# Patient Record
Sex: FEMALE | Race: AMERICAN INDIAN OR ALASKA NATIVE
[De-identification: names, ages, dates, MRNs, and addresses within clinical notes are randomized per-mention and may not be internally consistent; named-entity substitution may affect disease eponyms.]

---

## 2018-10-05 ENCOUNTER — HOSPITAL ENCOUNTER (EMERGENCY)
Dept: HOSPITAL 5 - ED | Age: 23
LOS: 1 days | Discharge: HOME | End: 2018-10-06
Payer: SELF-PAY

## 2018-10-05 DIAGNOSIS — Z88.8: ICD-10-CM

## 2018-10-05 DIAGNOSIS — E86.0: ICD-10-CM

## 2018-10-05 DIAGNOSIS — F12.10: ICD-10-CM

## 2018-10-05 DIAGNOSIS — N30.00: Primary | ICD-10-CM

## 2018-10-05 DIAGNOSIS — Z87.891: ICD-10-CM

## 2018-10-05 DIAGNOSIS — I10: ICD-10-CM

## 2018-10-05 PROCEDURE — 80053 COMPREHEN METABOLIC PANEL: CPT

## 2018-10-05 PROCEDURE — 85025 COMPLETE CBC W/AUTO DIFF WBC: CPT

## 2018-10-05 PROCEDURE — 84703 CHORIONIC GONADOTROPIN ASSAY: CPT

## 2018-10-05 PROCEDURE — 81001 URINALYSIS AUTO W/SCOPE: CPT

## 2018-10-05 PROCEDURE — 96374 THER/PROPH/DIAG INJ IV PUSH: CPT

## 2018-10-05 PROCEDURE — 96361 HYDRATE IV INFUSION ADD-ON: CPT

## 2018-10-05 PROCEDURE — 36415 COLL VENOUS BLD VENIPUNCTURE: CPT

## 2018-10-05 PROCEDURE — 83690 ASSAY OF LIPASE: CPT

## 2018-10-05 PROCEDURE — 99283 EMERGENCY DEPT VISIT LOW MDM: CPT

## 2018-10-06 VITALS — SYSTOLIC BLOOD PRESSURE: 150 MMHG | DIASTOLIC BLOOD PRESSURE: 78 MMHG

## 2018-10-06 LAB
ALBUMIN SERPL-MCNC: 4.6 G/DL (ref 3.9–5)
ALT SERPL-CCNC: 19 UNITS/L (ref 7–56)
BASOPHILS # (AUTO): 0 K/MM3 (ref 0–0.1)
BASOPHILS NFR BLD AUTO: 0.2 % (ref 0–1.8)
BILIRUB UR QL STRIP: (no result)
BLOOD UR QL VISUAL: (no result)
BUN SERPL-MCNC: 9 MG/DL (ref 7–17)
BUN/CREAT SERPL: 11 %
CALCIUM SERPL-MCNC: 9.8 MG/DL (ref 8.4–10.2)
EOSINOPHIL # BLD AUTO: 0.1 K/MM3 (ref 0–0.4)
EOSINOPHIL NFR BLD AUTO: 0.6 % (ref 0–4.3)
HCT VFR BLD CALC: 43.1 % (ref 30.3–42.9)
HEMOLYSIS INDEX: 8
HGB BLD-MCNC: 13.8 GM/DL (ref 10.1–14.3)
LIPASE SERPL-CCNC: 16 UNITS/L (ref 13–60)
LYMPHOCYTES # BLD AUTO: 0.7 K/MM3 (ref 1.2–5.4)
LYMPHOCYTES NFR BLD AUTO: 5.9 % (ref 13.4–35)
MCH RBC QN AUTO: 24 PG (ref 28–32)
MCHC RBC AUTO-ENTMCNC: 32 % (ref 30–34)
MCV RBC AUTO: 76 FL (ref 79–97)
MONOCYTES # (AUTO): 0.6 K/MM3 (ref 0–0.8)
MONOCYTES % (AUTO): 5.3 % (ref 0–7.3)
PH UR STRIP: 5 [PH] (ref 5–7)
PLATELET # BLD: 307 K/MM3 (ref 140–440)
PROT UR STRIP-MCNC: (no result) MG/DL
RBC # BLD AUTO: 5.67 M/MM3 (ref 3.65–5.03)
RBC #/AREA URNS HPF: 3 /HPF (ref 0–6)
UROBILINOGEN UR-MCNC: 4 MG/DL (ref ?–2)
WBC #/AREA URNS HPF: 4 /HPF (ref 0–6)

## 2018-10-06 NOTE — EMERGENCY DEPARTMENT REPORT
Vomiting/Diarrhea





- [facility]


Chief Complaint: Nausea/Vomiting/Diarrhea


Stated Complaint: N/V


Time Seen by Provider: 10/06/18 01:32


Duration: 1 Day


Severity: moderate


Nausea/Vomiting Severity: Moderate


Diarrhea Severity: Mild


Pain Location: Other (no pain)


Pain Severity: None


Symptoms: Yes Watery Diarrhea, Yes Able to Tolerate Fluids, No Recent Unusual 

Foods, No Recent Untreated Water, No Recent use of Antibiotics, No Family w/ 

Similar Symptoms, No Contacts w/ Similar Symptoms, No Rash, No Hematuria, No 

Recent URI Symptoms


Other History: ETOH over indulgance yesterday





ED Review of Systems


ROS: 


Stated complaint: N/V


Other details as noted in HPI





Constitutional: denies: chills, fever


Eyes: denies: eye pain, eye discharge, vision change


ENT: denies: ear pain, throat pain


Cardiovascular: denies: chest pain, palpitations


Gastrointestinal: nausea, vomiting.  denies: abdominal pain, diarrhea, 

constipation, hematemesis, melena, hematochezia


Genitourinary: denies: urgency, dysuria, discharge


Musculoskeletal: denies: back pain, joint swelling, arthralgia


Skin: denies: rash, lesions


Neurological: denies: headache, weakness, numbness, paresthesias, confusion, 

abnormal gait, vertigo


Psychiatric: denies: anxiety, depression


Hematological/Lymphatic: denies: easy bleeding, easy bruising





ED Past Medical Hx





- Past Medical History


Previous Medical History?: Yes


Hx Hypertension: Yes (gestational)





- Surgical History


Past Surgical History?: Yes


Additional Surgical History: C Sect





- Social History


Smoking Status: Former Smoker


Substance Use Type: Alcohol, Marijuana





- Medications


Home Medications: 


 Home Medications











 Medication  Instructions  Recorded  Confirmed  Last Taken  Type


 


Nitrofurantoin Monohyd/M-Cryst 100 mg PO BID #14 capsule 10/06/18  Unknown Rx





[Macrobid 100 mg Capsule]     


 


Ondansetron [Zofran Odt] 4 mg PO TID PRN 7 Days #15 tab 10/06/18  Unknown Rx














Vomiting Diarrhea Exam





- Exam


General: 


Vital signs noted. No distress. Alert and acting appropriately.





HEENT: Yes Moist Mucous Membranes, No Pharyngeal Erythema, No Pharyngeal 

Exudates, No Rhinorrhea, No Conjuctival Injection, No Frontal Tenderness, No 

Maxillary Tenderness


Neck: Yes Adenopathy, No Rigidity


Lungs: Yes Clear Lung Sounds, Yes Good Air Exchange, No Wheezes, No Stridor, No 

Cough, No Nasal Flaring, No Retractions, No Use of Accessory Muscles


Heart exam: Regular: Yes, Murmur: No, Tachycardia: Yes


Abdomen: Tenderness: No, Peritoneal Signs: No, Distention: No, Hyperactive 

Bowel sounds: No


Skin exam: Rash: No, Edema: No, Normal turgor: Yes


Neurologic: 


Alert and oriented, no deficits.








Musculoskeletal: 


Unremarkable.











ED Course


 Vital Signs











  10/05/18 10/05/18





  23:00 23:01


 


Temperature 99.7 F H 


 


Pulse Rate 96 H 97 H


 


Respiratory 18 





Rate  


 


Blood Pressure 150/78 


 


O2 Sat by Pulse 99 100





Oximetry  














ED Medical Decision Making





- Lab Data


Result diagrams: 


 10/06/18 00:12





 10/06/18 00:12





 Laboratory Tests











  10/06/18 10/06/18 10/06/18





  00:12 00:12 00:12


 


WBC  11.3 H  


 


RBC  5.67 H  


 


Hgb  13.8  


 


Hct  43.1 H  


 


MCV  76 L  


 


MCH  24 L  


 


MCHC  32  


 


RDW  17.9 H  


 


Plt Count  307  


 


Lymph % (Auto)  5.9 L  


 


Mono % (Auto)  5.3  


 


Eos % (Auto)  0.6  


 


Baso % (Auto)  0.2  


 


Lymph #  0.7 L  


 


Mono #  0.6  


 


Eos #  0.1  


 


Baso #  0.0  


 


Seg Neutrophils %  88.0 H  


 


Seg Neutrophils #  10.0 H  


 


Sodium   133 L 


 


Potassium   4.1 


 


Chloride   108.1 H 


 


Carbon Dioxide   27 


 


Anion Gap   2 


 


BUN   9 


 


Creatinine   0.8 


 


Estimated GFR   > 60 


 


BUN/Creatinine Ratio   11 


 


Glucose   106 H 


 


Calcium   9.8 


 


Total Bilirubin   1.00 


 


AST   23 


 


ALT   19 


 


Alkaline Phosphatase   108 


 


Total Protein   8.5 H 


 


Albumin   4.6 


 


Albumin/Globulin Ratio   1.2 


 


Lipase   16 


 


HCG, Qual    Negative


 


Urine Color   


 


Urine Turbidity   


 


Urine pH   


 


Ur Specific Gravity   


 


Urine Protein   


 


Urine Glucose (UA)   


 


Urine Ketones   


 


Urine Blood   


 


Urine Nitrite   


 


Urine Bilirubin   


 


Urine Urobilinogen   


 


Ur Leukocyte Esterase   


 


Urine WBC (Auto)   


 


Urine RBC (Auto)   


 


U Epithel Cells (Auto)   














  10/06/18





  00:55


 


WBC 


 


RBC 


 


Hgb 


 


Hct 


 


MCV 


 


MCH 


 


MCHC 


 


RDW 


 


Plt Count 


 


Lymph % (Auto) 


 


Mono % (Auto) 


 


Eos % (Auto) 


 


Baso % (Auto) 


 


Lymph # 


 


Mono # 


 


Eos # 


 


Baso # 


 


Seg Neutrophils % 


 


Seg Neutrophils # 


 


Sodium 


 


Potassium 


 


Chloride 


 


Carbon Dioxide 


 


Anion Gap 


 


BUN 


 


Creatinine 


 


Estimated GFR 


 


BUN/Creatinine Ratio 


 


Glucose 


 


Calcium 


 


Total Bilirubin 


 


AST 


 


ALT 


 


Alkaline Phosphatase 


 


Total Protein 


 


Albumin 


 


Albumin/Globulin Ratio 


 


Lipase 


 


HCG, Qual 


 


Urine Color  Yellow


 


Urine Turbidity  Cloudy


 


Urine pH  5.0


 


Ur Specific Gravity  1.019


 


Urine Protein  <15 mg/dl


 


Urine Glucose (UA)  Neg


 


Urine Ketones  20


 


Urine Blood  Neg


 


Urine Nitrite  Neg


 


Urine Bilirubin  Neg


 


Urine Urobilinogen  4.0


 


Ur Leukocyte Esterase  Mod


 


Urine WBC (Auto)  4.0


 


Urine RBC (Auto)  3.0


 


U Epithel Cells (Auto)  11.0














- Medical Decision Making


Patient presented for abdominal pain nausea and vomiting diarrhea 1 yesterday 

patient states I just drank too much labs noted consistent with mild 

dehydration patient denies abdominal pain back pain no chest pain or shortness 

of breath plan hydrate normal saline 1 L Zofran by mouth challenge the patient 

is discharged To DC to home in stable condition patient will continue to 

rehydrate return to clinic should his symptoms worsen patient verbalizes 

agreement and understanding of same.  pt is currently tolerating po intake with 

n/v a this time. 





Critical care attestation.: 


If time is entered above; I have spent that time in minutes in the direct care 

of this critically ill patient, excluding procedure time.








ED Disposition


Clinical Impression: 


 Mild dehydration, Nausea and vomiting in adult





UTI (urinary tract infection)


Qualifiers:


 Urinary tract infection type: acute cystitis Hematuria presence: without 

hematuria Qualified Code(s): N30.00 - Acute cystitis without hematuria





Disposition: DC-01 TO HOME OR SELFCARE


Is pt being admited?: No


Does the pt Need Aspirin: No


Condition: Good


Instructions:  Dehydration (ED), Urinary Tract Infection in Women (ED)


Prescriptions: 


Nitrofurantoin Monohyd/M-Cryst [Macrobid 100 mg Capsule] 100 mg PO BID #14 

capsule


Ondansetron [Zofran Odt] 4 mg PO TID PRN 7 Days #15 tab


 PRN Reason: Nausea And Vomiting


Referrals: 


[facility] [Outside] - 3-5 Days


Forms:  Work/School Release Form(ED)


Time of Disposition: 03:16